# Patient Record
Sex: MALE | Race: WHITE | NOT HISPANIC OR LATINO | Employment: OTHER | URBAN - METROPOLITAN AREA
[De-identification: names, ages, dates, MRNs, and addresses within clinical notes are randomized per-mention and may not be internally consistent; named-entity substitution may affect disease eponyms.]

---

## 2023-04-03 ENCOUNTER — TELEPHONE (OUTPATIENT)
Dept: PRIMARY CARE | Facility: CLINIC | Age: 63
End: 2023-04-03

## 2023-04-03 NOTE — TELEPHONE ENCOUNTER
's office called [phone] to say that pt passed away and will be calling back tomorrow morning to see if you can sign the death certificate.

## 2023-04-05 NOTE — TELEPHONE ENCOUNTER
[facility] Completed and ready for . Please let  Home know [phone] and cuco patient as .   Thanks,  Sully Kimble, DO

## 2023-05-01 NOTE — TELEPHONE ENCOUNTER
Received a call from  Arsalan Lofton from 's office ([phone]). They ended up doing toxicology report due to information from police department and it came back with levels of nortriptyline 4 times higher than the toxic amount.     I let Arsalan know that as far as I knew, patient did not have hx of depression or anxiety and that the nortriptyline was being used for migraine prevention and was last sent to pharmacy 4/2021 with 1 year of refills and so patient should have run out a year ago. I last saw patient 10/18/2021.   Arsalan appreciated the information and let me know that they would be submitted a formal release of records request for the investigation.     Sully Kimble, DO